# Patient Record
Sex: FEMALE | Race: WHITE | Employment: OTHER | URBAN - METROPOLITAN AREA
[De-identification: names, ages, dates, MRNs, and addresses within clinical notes are randomized per-mention and may not be internally consistent; named-entity substitution may affect disease eponyms.]

---

## 2017-02-03 RX ORDER — ATORVASTATIN CALCIUM 40 MG/1
TABLET, FILM COATED ORAL
Qty: 30 TABLET | Refills: 5 | Status: SHIPPED | OUTPATIENT
Start: 2017-02-03 | End: 2017-08-22 | Stop reason: SDUPTHER

## 2017-02-13 RX ORDER — FUROSEMIDE 40 MG/1
TABLET ORAL
Qty: 90 TABLET | Refills: 3 | Status: SHIPPED | OUTPATIENT
Start: 2017-02-13 | End: 2017-11-07 | Stop reason: SDUPTHER

## 2017-02-16 DIAGNOSIS — E87.5 HYPERKALEMIA: ICD-10-CM

## 2017-02-16 DIAGNOSIS — I10 ESSENTIAL HYPERTENSION: ICD-10-CM

## 2017-02-16 LAB
ANION GAP SERPL CALCULATED.3IONS-SCNC: 18 MMOL/L (ref 3–16)
BUN BLDV-MCNC: 22 MG/DL (ref 7–20)
CALCIUM SERPL-MCNC: 8.8 MG/DL (ref 8.3–10.6)
CHLORIDE BLD-SCNC: 95 MMOL/L (ref 99–110)
CO2: 27 MMOL/L (ref 21–32)
CREAT SERPL-MCNC: 1.3 MG/DL (ref 0.6–1.2)
GFR AFRICAN AMERICAN: 47
GFR NON-AFRICAN AMERICAN: 39
GLUCOSE BLD-MCNC: 237 MG/DL (ref 70–99)
POTASSIUM SERPL-SCNC: 4.1 MMOL/L (ref 3.5–5.1)
SODIUM BLD-SCNC: 140 MMOL/L (ref 136–145)

## 2017-02-17 ENCOUNTER — TELEPHONE (OUTPATIENT)
Dept: CARDIOLOGY CLINIC | Age: 82
End: 2017-02-17

## 2017-02-22 ENCOUNTER — TELEPHONE (OUTPATIENT)
Dept: CARDIOLOGY CLINIC | Age: 82
End: 2017-02-22

## 2017-02-22 RX ORDER — NITROGLYCERIN 0.4 MG/1
TABLET SUBLINGUAL
Qty: 25 TABLET | Refills: 5 | Status: SHIPPED | OUTPATIENT
Start: 2017-02-22

## 2017-03-09 ENCOUNTER — OFFICE VISIT (OUTPATIENT)
Dept: CARDIOLOGY CLINIC | Age: 82
End: 2017-03-09

## 2017-03-09 VITALS
HEIGHT: 63 IN | HEART RATE: 76 BPM | DIASTOLIC BLOOD PRESSURE: 64 MMHG | OXYGEN SATURATION: 98 % | WEIGHT: 150 LBS | BODY MASS INDEX: 26.58 KG/M2 | SYSTOLIC BLOOD PRESSURE: 108 MMHG

## 2017-03-09 DIAGNOSIS — I25.810 CORONARY ARTERY DISEASE INVOLVING CORONARY BYPASS GRAFT OF NATIVE HEART WITHOUT ANGINA PECTORIS: Primary | ICD-10-CM

## 2017-03-09 DIAGNOSIS — I10 ESSENTIAL HYPERTENSION: ICD-10-CM

## 2017-03-09 DIAGNOSIS — E78.5 HYPERLIPIDEMIA, UNSPECIFIED HYPERLIPIDEMIA TYPE: ICD-10-CM

## 2017-03-09 PROCEDURE — G8484 FLU IMMUNIZE NO ADMIN: HCPCS | Performed by: NURSE PRACTITIONER

## 2017-03-09 PROCEDURE — 99214 OFFICE O/P EST MOD 30 MIN: CPT | Performed by: NURSE PRACTITIONER

## 2017-03-09 PROCEDURE — 4040F PNEUMOC VAC/ADMIN/RCVD: CPT | Performed by: NURSE PRACTITIONER

## 2017-03-09 PROCEDURE — G8420 CALC BMI NORM PARAMETERS: HCPCS | Performed by: NURSE PRACTITIONER

## 2017-03-09 PROCEDURE — G8598 ASA/ANTIPLAT THER USED: HCPCS | Performed by: NURSE PRACTITIONER

## 2017-03-09 PROCEDURE — 1123F ACP DISCUSS/DSCN MKR DOCD: CPT | Performed by: NURSE PRACTITIONER

## 2017-03-09 PROCEDURE — 1090F PRES/ABSN URINE INCON ASSESS: CPT | Performed by: NURSE PRACTITIONER

## 2017-03-09 PROCEDURE — G8427 DOCREV CUR MEDS BY ELIG CLIN: HCPCS | Performed by: NURSE PRACTITIONER

## 2017-03-09 PROCEDURE — 1036F TOBACCO NON-USER: CPT | Performed by: NURSE PRACTITIONER

## 2017-05-18 ENCOUNTER — HOSPITAL ENCOUNTER (OUTPATIENT)
Dept: CT IMAGING | Age: 82
Discharge: HOME | End: 2017-05-18
Attending: INTERNAL MEDICINE | Admitting: INTERNAL MEDICINE

## 2017-05-18 DIAGNOSIS — R91.8 OTHER NONSPECIFIC ABNORMAL FINDING OF LUNG FIELD: ICD-10-CM

## 2017-05-18 DIAGNOSIS — R91.8 LUNG NODULES: ICD-10-CM

## 2017-06-05 ENCOUNTER — HOSPITAL ENCOUNTER (OUTPATIENT)
Dept: WOMENS IMAGING | Age: 82
Discharge: HOME | End: 2017-06-05
Attending: INTERNAL MEDICINE | Admitting: INTERNAL MEDICINE

## 2017-06-05 DIAGNOSIS — M81.0 AGE-RELATED OSTEOPOROSIS WITHOUT CURRENT PATHOLOGICAL FRACTURE: ICD-10-CM

## 2017-06-05 DIAGNOSIS — M81.0 OSTEOPOROSIS: ICD-10-CM

## 2017-07-13 ENCOUNTER — OFFICE VISIT (OUTPATIENT)
Dept: CARDIOLOGY CLINIC | Age: 82
End: 2017-07-13

## 2017-07-13 VITALS
HEIGHT: 62 IN | DIASTOLIC BLOOD PRESSURE: 52 MMHG | WEIGHT: 145 LBS | SYSTOLIC BLOOD PRESSURE: 110 MMHG | BODY MASS INDEX: 26.68 KG/M2 | OXYGEN SATURATION: 96 % | HEART RATE: 64 BPM

## 2017-07-13 DIAGNOSIS — I25.10 CORONARY ARTERY DISEASE INVOLVING NATIVE CORONARY ARTERY OF NATIVE HEART WITHOUT ANGINA PECTORIS: Primary | ICD-10-CM

## 2017-07-13 DIAGNOSIS — I10 ESSENTIAL HYPERTENSION: ICD-10-CM

## 2017-07-13 DIAGNOSIS — E78.5 HYPERLIPIDEMIA, UNSPECIFIED HYPERLIPIDEMIA TYPE: ICD-10-CM

## 2017-07-13 PROCEDURE — 1036F TOBACCO NON-USER: CPT | Performed by: NURSE PRACTITIONER

## 2017-07-13 PROCEDURE — 1123F ACP DISCUSS/DSCN MKR DOCD: CPT | Performed by: NURSE PRACTITIONER

## 2017-07-13 PROCEDURE — G8598 ASA/ANTIPLAT THER USED: HCPCS | Performed by: NURSE PRACTITIONER

## 2017-07-13 PROCEDURE — G8419 CALC BMI OUT NRM PARAM NOF/U: HCPCS | Performed by: NURSE PRACTITIONER

## 2017-07-13 PROCEDURE — 99214 OFFICE O/P EST MOD 30 MIN: CPT | Performed by: NURSE PRACTITIONER

## 2017-07-13 PROCEDURE — 1090F PRES/ABSN URINE INCON ASSESS: CPT | Performed by: NURSE PRACTITIONER

## 2017-07-13 PROCEDURE — 4040F PNEUMOC VAC/ADMIN/RCVD: CPT | Performed by: NURSE PRACTITIONER

## 2017-07-13 PROCEDURE — G8427 DOCREV CUR MEDS BY ELIG CLIN: HCPCS | Performed by: NURSE PRACTITIONER

## 2017-07-13 RX ORDER — ISOSORBIDE MONONITRATE 30 MG/1
30 TABLET, EXTENDED RELEASE ORAL DAILY
Qty: 90 TABLET | Refills: 1 | Status: SHIPPED | OUTPATIENT
Start: 2017-07-13 | End: 2017-11-07 | Stop reason: SDUPTHER

## 2017-08-23 RX ORDER — ATORVASTATIN CALCIUM 40 MG/1
TABLET, FILM COATED ORAL
Qty: 30 TABLET | Refills: 6 | Status: SHIPPED | OUTPATIENT
Start: 2017-08-23

## 2017-10-17 PROBLEM — J18.9 CAP (COMMUNITY ACQUIRED PNEUMONIA): Status: ACTIVE | Noted: 2017-10-17

## 2017-10-17 PROBLEM — N39.0 UTI (URINARY TRACT INFECTION): Status: ACTIVE | Noted: 2017-10-17

## 2017-10-17 PROBLEM — A41.9 SEPSIS (HCC): Status: ACTIVE | Noted: 2017-10-17

## 2017-11-07 ENCOUNTER — OFFICE VISIT (OUTPATIENT)
Dept: CARDIOLOGY CLINIC | Age: 82
End: 2017-11-07

## 2017-11-07 VITALS
OXYGEN SATURATION: 90 % | SYSTOLIC BLOOD PRESSURE: 102 MMHG | HEIGHT: 63 IN | HEART RATE: 86 BPM | BODY MASS INDEX: 24.98 KG/M2 | DIASTOLIC BLOOD PRESSURE: 58 MMHG | WEIGHT: 141 LBS

## 2017-11-07 DIAGNOSIS — I25.10 CORONARY ARTERY DISEASE INVOLVING NATIVE CORONARY ARTERY OF NATIVE HEART WITHOUT ANGINA PECTORIS: ICD-10-CM

## 2017-11-07 DIAGNOSIS — I48.0 PAF (PAROXYSMAL ATRIAL FIBRILLATION) (HCC): ICD-10-CM

## 2017-11-07 DIAGNOSIS — I10 ESSENTIAL HYPERTENSION: ICD-10-CM

## 2017-11-07 DIAGNOSIS — R42 DIZZINESS: ICD-10-CM

## 2017-11-07 DIAGNOSIS — R06.02 SOB (SHORTNESS OF BREATH) ON EXERTION: Primary | ICD-10-CM

## 2017-11-07 PROCEDURE — 99214 OFFICE O/P EST MOD 30 MIN: CPT | Performed by: NURSE PRACTITIONER

## 2017-11-07 PROCEDURE — G8420 CALC BMI NORM PARAMETERS: HCPCS | Performed by: NURSE PRACTITIONER

## 2017-11-07 PROCEDURE — G8427 DOCREV CUR MEDS BY ELIG CLIN: HCPCS | Performed by: NURSE PRACTITIONER

## 2017-11-07 PROCEDURE — 93000 ELECTROCARDIOGRAM COMPLETE: CPT | Performed by: NURSE PRACTITIONER

## 2017-11-07 PROCEDURE — 4040F PNEUMOC VAC/ADMIN/RCVD: CPT | Performed by: NURSE PRACTITIONER

## 2017-11-07 PROCEDURE — 1090F PRES/ABSN URINE INCON ASSESS: CPT | Performed by: NURSE PRACTITIONER

## 2017-11-07 PROCEDURE — 1111F DSCHRG MED/CURRENT MED MERGE: CPT | Performed by: NURSE PRACTITIONER

## 2017-11-07 PROCEDURE — 1036F TOBACCO NON-USER: CPT | Performed by: NURSE PRACTITIONER

## 2017-11-07 PROCEDURE — G8484 FLU IMMUNIZE NO ADMIN: HCPCS | Performed by: NURSE PRACTITIONER

## 2017-11-07 PROCEDURE — 1123F ACP DISCUSS/DSCN MKR DOCD: CPT | Performed by: NURSE PRACTITIONER

## 2017-11-07 PROCEDURE — G8598 ASA/ANTIPLAT THER USED: HCPCS | Performed by: NURSE PRACTITIONER

## 2017-11-07 RX ORDER — FUROSEMIDE 40 MG/1
40 TABLET ORAL DAILY
Qty: 90 TABLET | Refills: 3 | Status: ON HOLD
Start: 2017-11-07 | End: 2018-01-24

## 2017-11-07 RX ORDER — ISOSORBIDE MONONITRATE 30 MG/1
15 TABLET, EXTENDED RELEASE ORAL DAILY
Qty: 90 TABLET | Refills: 1
Start: 2017-11-07 | End: 2017-12-06 | Stop reason: SDUPTHER

## 2017-11-07 NOTE — LETTER
without angina pectoris  -known multivessel disease (angiogram in 2013)  -prior stent to LAD  -2/2017: Lexiscan-Myoview: with mild reversibility apical alt wall; mixed ischemia and scar  -continue on medical management with ASA, BB and statin and nitrate  -no recurrent angina    4. Essential hypertension  -controlled  -continue medical management  -low sodium diet    5. PAF (paroxysmal atrial fib)  -has maintained SR  -continue BB and ASA  -not on long term anticoagulation d/t maintenance of SR and H/O falls, anemia    6. Hyperlipidemia, unspecified hyperlipidemia type  -on statin  -goal LDL < 70  -LDL at goal on labs from 9/2016    Plan:    Increase Lasix to 40 mg daily x 5-7 days and then decrease to every other day  Decrease Imdur to 15 mg daily  Continue metoprolol, ASA  And statin  Claritin 10 mg daily and Mucinex ok to take for sinus congestion and cough  Discussed low fat/low sodium diet, fluid restriction and reinforced regular activity/ exercise   Check BMP in 1-2 weeks  Check echo as previously ordered by Dr. Arya Morales  Follow up in office in 3-4 months with Dr. Arya Morales or D. Enzweiler, CNP or sooner if needed    Return in about 3 months (around 2/7/2018) for 3-4 months with Dr. Arya Morales or D. Enzweiler, CNP. Thanks for allowing me to participate in the care of this patient. If you have questions, please do not hesitate to call me. I look forward to following Christo Tiwarie along with you.     Sincerely,        CARLOS A Wu CNP

## 2017-11-07 NOTE — PROGRESS NOTES
[facility]  Office Visit    Hui Maxwell  8/14/1929 November 7, 2017    CC:   Chief Complaint   Patient presents with    Atrial Fibrillation     CAD, CHF, HTN, HLD    Follow-Up from Hospital     Patient states feeling light headed and SOB on exertion. PT diagnosed with pneumonia 2 weeks ago.  Edema     Bilateral ankles/feet-worse since last OV    Other     Discuss allergy medicine     HPI:  The patient is 80 y.o. female with a past medical history significant for CAD/ prior CABG, PAF, HTN and hyperlipidemia who was hospitalized earlier this year after presenting with chest pain and mildly elevated troponin. She underwent Lexiscan-Myoview which showed mild reversibility in apical lateral wall with basal to mid inferior and inf-lateral walls appearing fixed. Normal LV size and function. She was maintained on medical management. She was seen in 7/2017 and was stable from a cardiac standpoint with no angina noted. Her meds were maintained at that point. She is here today for follow up. Since last visit was hospitalized from 10/16/2017-10/18/2017 with confusion, UTI, LLL PNA and placed on IV abx and gentle diuresis. Not seen by cardiology at that time. Overall doing okay except for some SOB and mild dizziness. SOBOE has worsened some since PNA. Continues to cough-congested but nonproductive. Increased edema in lower legs. Weight gain 4-5# over last month. Sleeps chronically in recliner. Has noted some mild dizziness when she first stands from a sitting position and passes quickly. Denies chest pain/discomfort, SOB, orthopnea/PND, palpitations, syncope, or claudication. Review of Systems:  Constitutional: Denies night sweats or fever. + fatigue/weakness  HEENT: Denies new visual changes, ringing in ears, nosebleeds. + nasal congestion  Respiratory: Denies new or change in SOB, PND, orthopnea or cough.    Cardiovascular: see HPI  GI: Denies N/V, diarrhea, constipation, abdominal pain, change in bowel habits, melena or hematochezia  : Denies urinary frequency, urgency, incontinence   + dysuria improved  Skin: Denies rash, hives, or cyanosis  Musculoskeletal: Denies muscle/joint aches and pains  Neurological: Denies syncope or TIA-like symptoms.   Psychiatric: Denies anxiety, insomnia or depression     Past Medical History:   Diagnosis Date    Atrial fibrillation ([facility] [address]     CAD (coronary artery disease)     Heart Attack at 80years old    Cancer [facility])     Left breast    CHF (congestive heart failure) (HCC)     GERD (gastroesophageal reflux disease)     Hyperglycemia     Hyperlipidemia     Hypertension      Past Surgical History:   Procedure Laterality Date    CARDIAC SURGERY  2011    CABG    CORONARY ANGIOPLASTY WITH STENT PLACEMENT  2011    CORONARY ARTERY BYPASS GRAFT       Family History   Problem Relation Age of Onset    Diabetes Sister     Kidney Disease Father 36        [facility] Heart Disease Sister 58      MI     Social History   Substance Use Topics    Smoking status: Never Smoker    Smokeless tobacco: Never Used    Alcohol use Yes      Comment: rarely       Allergies   Allergen Reactions    Xanax [Alprazolam]      hallucinations     Current Outpatient Prescriptions   Medication Sig Dispense Refill    furosemide (LASIX) 40 MG tablet Take 1 tablet by mouth daily 90 tablet 3    isosorbide mononitrate (IMDUR) 30 MG extended release tablet Take 0.5 tablets by mouth daily 90 tablet 1    metoprolol tartrate (LOPRESSOR) 25 MG tablet Take 0.5 tablets by mouth 2 times daily 90 tablet 3    atorvastatin (LIPITOR) 40 MG tablet TAKE ONE TABLET BY MOUTH DAILY 30 tablet 6    anastrozole (ARIMIDEX) 1 MG tablet TAKE ONE TABLET BY MOUTH DAILY (Patient taking differently: daily) 30 tablet 3    aspirin 81 MG tablet Take 81 mg by mouth daily      NITROSTAT 0.4 MG SL tablet DISSOLVE 1 TAB UNDER TONGUE FOR CHEST PAIN - IF PAIN REMAINS AFTER 5 MIN, CALL 911 AND REPEAT DOSE. MAX 3 TABS IN 15 MINUTES 25 tablet 5    acetaminophen (APAP EXTRA STRENGTH) 500 MG tablet Take 1 tablet by mouth every 6 hours as needed for Pain 60 tablet 0     No current facility-administered medications for this visit. Physical Exam:   BP (!) 102/58 (Site: Left Arm, Position: Sitting)   Pulse 86   Ht 5' 3\" (1.6 m)   Wt 141 lb (64 kg) Comment: shoes on  SpO2 90%   BMI 24.98 kg/m²   Wt Readings from Last 2 Encounters:   11/07/17 141 lb (64 kg)   10/18/17 137 lb 12.6 oz (62.5 kg)     Constitutional: She is oriented to person, place, and time. She appears well-developed and well-nourished. In no acute distress. HEENT: Normocephalic and atraumatic. Sclerae anicteric. No xanthelasmas. .  Neck: Neck supple. + mild JVD. Carotids without bruits. No thyromegaly present. Cardiovascular: RRR, normal S1 and S2; I/VI systolic murmur  Pulmonary/Chest: Effort normal and breath sounds normal.  Lungs with decreased breath sounds in L posterior base  Abdominal: soft, nontender, nondistended. + bowel sounds; no hepatomegaly or bruits. Extremities: No cyanosis, or clubbing. Pulses are 2+ radial/DP bilaterally. Cap refill brisk. 2+ bilateral ankle edema. Neurological: No focal deficit. Skin: Skin is warm and dry. Psychiatric: She has a normal mood and affect.  Her speech is normal and behavior is normal.     Lab Review:   Lab Results   Component Value Date    TRIG 133 09/12/2016    HDL 40 09/12/2016    HDL 37 07/12/2011    LDLCALC 47 09/12/2016    LABVLDL 27 09/12/2016     Lab Results   Component Value Date     10/18/2017    K 3.0 10/18/2017     10/18/2017    CO2 27 10/18/2017    BUN 14 10/18/2017    CREATININE 0.8 10/18/2017    GLUCOSE 92 10/18/2017    GLUCOSE 344 02/11/2015    CALCIUM 7.8 10/18/2017      Lab Results   Component Value Date    WBC 4.9 10/18/2017    HGB 10.4 (L) 10/18/2017    HCT 31.8 (L) 10/18/2017    MCV 80.3 10/18/2017     10/18/2017     2/21/2017

## 2017-11-08 NOTE — COMMUNICATION BODY
anticoagulation d/t maintenance of SR and H/O falls, anemia    6. Hyperlipidemia, unspecified hyperlipidemia type  -on statin  -goal LDL < 70  -LDL at goal on labs from 9/2016    Plan:    Increase Lasix to 40 mg daily x 5-7 days and then decrease to every other day  Decrease Imdur to 15 mg daily  Continue metoprolol, ASA  And statin  Claritin 10 mg daily and Mucinex ok to take for sinus congestion and cough  Discussed low fat/low sodium diet, fluid restriction and reinforced regular activity/ exercise   Check BMP in 1-2 weeks  Check echo as previously ordered by Dr. Reagan Rahman  Follow up in office in 3-4 months with Dr. Reagan Rahman or D. Enzweiler, CNP or sooner if needed    Return in about 3 months (around 2/7/2018) for 3-4 months with Dr. Reagan Rahman or D. Enzweiler, CNP. Thanks for allowing me to participate in the care of this patient.

## 2017-11-25 ENCOUNTER — HOSPITAL ENCOUNTER (OUTPATIENT)
Dept: NON INVASIVE DIAGNOSTICS | Age: 82
Discharge: HOME | End: 2017-11-25
Attending: NURSE PRACTITIONER | Admitting: NURSE PRACTITIONER

## 2017-11-25 DIAGNOSIS — I25.10 CORONARY ARTERY DISEASE INVOLVING NATIVE CORONARY ARTERY OF NATIVE HEART WITHOUT ANGINA PECTORIS: ICD-10-CM

## 2017-11-25 DIAGNOSIS — R06.02 SOB (SHORTNESS OF BREATH) ON EXERTION: ICD-10-CM

## 2017-11-25 DIAGNOSIS — R06.02 SHORTNESS OF BREATH: ICD-10-CM

## 2017-11-25 LAB
ANION GAP SERPL CALCULATED.3IONS-SCNC: 13 MMOL/L (ref 3–16)
BUN BLDV-MCNC: 13 MG/DL (ref 7–20)
CALCIUM SERPL-MCNC: 8.3 MG/DL (ref 8.3–10.6)
CHLORIDE BLD-SCNC: 99 MMOL/L (ref 99–110)
CO2: 22 MMOL/L (ref 21–32)
CREAT SERPL-MCNC: 0.7 MG/DL (ref 0.6–1.2)
GFR AFRICAN AMERICAN: >60
GFR NON-AFRICAN AMERICAN: >60
GLUCOSE BLD-MCNC: 104 MG/DL (ref 70–99)
LV EF: 60 %
LVEF MODALITY: NORMAL
POTASSIUM SERPL-SCNC: 4.4 MMOL/L (ref 3.5–5.1)
SODIUM BLD-SCNC: 134 MMOL/L (ref 136–145)

## 2017-12-03 DIAGNOSIS — I25.10 CORONARY ARTERY DISEASE INVOLVING NATIVE CORONARY ARTERY OF NATIVE HEART WITHOUT ANGINA PECTORIS: ICD-10-CM

## 2017-12-06 RX ORDER — ISOSORBIDE MONONITRATE 30 MG/1
TABLET, EXTENDED RELEASE ORAL
Qty: 90 TABLET | Refills: 1 | Status: ON HOLD | OUTPATIENT
Start: 2017-12-06 | End: 2018-01-24 | Stop reason: HOSPADM

## 2017-12-07 ENCOUNTER — HOSPITAL ENCOUNTER (OUTPATIENT)
Dept: CT IMAGING | Age: 82
Discharge: HOME | End: 2017-12-07
Attending: INTERNAL MEDICINE | Admitting: INTERNAL MEDICINE

## 2017-12-07 DIAGNOSIS — M81.0 SENILE OSTEOPOROSIS: ICD-10-CM

## 2017-12-07 DIAGNOSIS — C50.112 MALIGNANT NEOPLASM OF CENTRAL PORTION OF LEFT FEMALE BREAST (HCC): ICD-10-CM

## 2018-01-18 PROBLEM — J90 PLEURAL EFFUSION ON LEFT: Status: ACTIVE | Noted: 2018-01-18

## 2018-01-18 PROBLEM — R06.02 SOB (SHORTNESS OF BREATH): Status: ACTIVE | Noted: 2018-01-18

## 2018-01-22 PROBLEM — R93.89 ABNORMAL RADIOGRAPH: Status: ACTIVE | Noted: 2018-01-22

## 2018-01-30 ENCOUNTER — TELEPHONE (OUTPATIENT)
Dept: CARDIOLOGY CLINIC | Age: 83
End: 2018-01-30

## 2018-01-30 NOTE — TELEPHONE ENCOUNTER
Recent admission to the hospital- has follow up scheduled with Marisel 2/8/18. Niece called in worried about low BP: 97/39. Reports that pt feels weak. No other symptoms. Per hospital notes, she has been dealing with low BP-not new. I instructed her to provide her with extra fluids and see if that helps. She will call us back if needed.

## 2018-01-30 NOTE — TELEPHONE ENCOUNTER
Patient's niece Feihimanshu Abdi called to report that the pt's bp was 97/35 around noon today. Patient is complaining of feeling weak and sob. You can reach Fei Abdi at [phone].

## 2018-04-12 PROBLEM — N39.0 UTI (URINARY TRACT INFECTION): Status: RESOLVED | Noted: 2017-10-17 | Resolved: 2018-04-12
